# Patient Record
Sex: FEMALE | Race: WHITE | NOT HISPANIC OR LATINO | URBAN - METROPOLITAN AREA
[De-identification: names, ages, dates, MRNs, and addresses within clinical notes are randomized per-mention and may not be internally consistent; named-entity substitution may affect disease eponyms.]

---

## 2018-09-24 ENCOUNTER — OUTPATIENT (OUTPATIENT)
Dept: OUTPATIENT SERVICES | Facility: HOSPITAL | Age: 53
LOS: 1 days | End: 2018-09-24
Payer: COMMERCIAL

## 2018-09-24 VITALS
RESPIRATION RATE: 17 BRPM | SYSTOLIC BLOOD PRESSURE: 124 MMHG | DIASTOLIC BLOOD PRESSURE: 52 MMHG | HEART RATE: 87 BPM | OXYGEN SATURATION: 99 %

## 2018-09-24 VITALS
WEIGHT: 188.72 LBS | RESPIRATION RATE: 15 BRPM | HEART RATE: 72 BPM | HEIGHT: 66 IN | TEMPERATURE: 98 F | DIASTOLIC BLOOD PRESSURE: 83 MMHG | SYSTOLIC BLOOD PRESSURE: 134 MMHG | OXYGEN SATURATION: 100 %

## 2018-09-24 DIAGNOSIS — Z98.890 OTHER SPECIFIED POSTPROCEDURAL STATES: Chronic | ICD-10-CM

## 2018-09-24 DIAGNOSIS — H33.012 RETINAL DETACHMENT WITH SINGLE BREAK, LEFT EYE: ICD-10-CM

## 2018-09-24 LAB — HCG UR QL: NEGATIVE — SIGNIFICANT CHANGE UP

## 2018-09-24 PROCEDURE — C1889: CPT

## 2018-09-24 PROCEDURE — C1814: CPT

## 2018-09-24 PROCEDURE — 93005 ELECTROCARDIOGRAM TRACING: CPT

## 2018-09-24 PROCEDURE — 67108 REPAIR DETACHED RETINA: CPT | Mod: LT

## 2018-09-24 PROCEDURE — 81025 URINE PREGNANCY TEST: CPT

## 2018-09-24 PROCEDURE — C1784: CPT

## 2018-09-24 PROCEDURE — 93010 ELECTROCARDIOGRAM REPORT: CPT

## 2018-09-24 NOTE — ASU DISCHARGE PLAN (ADULT/PEDIATRIC). - NOTIFY
Increased Irritability or Sluggishness/Swelling that continues/Fever greater than 101/Pain not relieved by Medications/Inability to Tolerate Liquids or Foods/Persistent Nausea and Vomiting/Unable to Urinate/Bleeding that does not stop

## 2018-09-24 NOTE — ASU DISCHARGE PLAN (ADULT/PEDIATRIC). - SPECIAL INSTRUCTIONS
tylenol  (acetaminophen) for pain  leave on eye patch  Position face down during the day  Sleep on either side  but NOT on back looking up

## 2018-09-24 NOTE — ASU DISCHARGE PLAN (ADULT/PEDIATRIC). - PROCEDURE
Left Eye Scleral Buckle, Pars Plana Vitrectomy , Endolaser, Injection of Perfluoron, C3F8 Gas Injection.

## 2022-01-26 ENCOUNTER — TRANSCRIPTION ENCOUNTER (OUTPATIENT)
Age: 57
End: 2022-01-26

## 2022-11-05 ENCOUNTER — NON-APPOINTMENT (OUTPATIENT)
Age: 57
End: 2022-11-05

## 2024-09-21 ENCOUNTER — NON-APPOINTMENT (OUTPATIENT)
Age: 59
End: 2024-09-21